# Patient Record
Sex: FEMALE | Race: OTHER | HISPANIC OR LATINO
[De-identification: names, ages, dates, MRNs, and addresses within clinical notes are randomized per-mention and may not be internally consistent; named-entity substitution may affect disease eponyms.]

---

## 2021-05-13 PROBLEM — Z00.00 ENCOUNTER FOR PREVENTIVE HEALTH EXAMINATION: Status: ACTIVE | Noted: 2021-05-13

## 2021-05-14 ENCOUNTER — APPOINTMENT (OUTPATIENT)
Dept: ANTEPARTUM | Facility: CLINIC | Age: 36
End: 2021-05-14

## 2021-06-04 ENCOUNTER — ASOB RESULT (OUTPATIENT)
Age: 36
End: 2021-06-04

## 2021-06-04 ENCOUNTER — APPOINTMENT (OUTPATIENT)
Dept: ANTEPARTUM | Facility: CLINIC | Age: 36
End: 2021-06-04
Payer: MEDICAID

## 2021-06-04 PROCEDURE — 76816 OB US FOLLOW-UP PER FETUS: CPT

## 2021-06-10 ENCOUNTER — ASOB RESULT (OUTPATIENT)
Age: 36
End: 2021-06-10

## 2021-06-10 ENCOUNTER — APPOINTMENT (OUTPATIENT)
Dept: MATERNAL FETAL MEDICINE | Facility: CLINIC | Age: 36
End: 2021-06-10
Payer: MEDICAID

## 2021-06-10 PROCEDURE — 99202 OFFICE O/P NEW SF 15 MIN: CPT | Mod: 95

## 2021-06-11 ENCOUNTER — APPOINTMENT (OUTPATIENT)
Dept: ANTEPARTUM | Facility: CLINIC | Age: 36
End: 2021-06-11
Payer: MEDICAID

## 2021-06-11 ENCOUNTER — TRANSCRIPTION ENCOUNTER (OUTPATIENT)
Age: 36
End: 2021-06-11

## 2021-06-11 DIAGNOSIS — O28.1 ABNORMAL BIOCHEMICAL FINDING ON ANTENATAL SCREENING OF MOTHER: ICD-10-CM

## 2021-06-11 DIAGNOSIS — O09.529 SUPERVISION OF ELDERLY MULTIGRAVIDA, UNSPECIFIED TRIMESTER: ICD-10-CM

## 2021-06-11 DIAGNOSIS — O35.1XX0 MATERNAL CARE FOR (SUSPECTED) CHROMOSOMAL ABNORMALITY IN FETUS, NOT APPLICABLE OR UNSPECIFIED: ICD-10-CM

## 2021-06-11 PROCEDURE — 36415 COLL VENOUS BLD VENIPUNCTURE: CPT

## 2021-06-18 ENCOUNTER — NON-APPOINTMENT (OUTPATIENT)
Age: 36
End: 2021-06-18

## 2021-07-02 ENCOUNTER — APPOINTMENT (OUTPATIENT)
Dept: ANTEPARTUM | Facility: CLINIC | Age: 36
End: 2021-07-02
Payer: MEDICAID

## 2021-07-02 ENCOUNTER — ASOB RESULT (OUTPATIENT)
Age: 36
End: 2021-07-02

## 2021-07-02 PROCEDURE — 76811 OB US DETAILED SNGL FETUS: CPT

## 2021-07-02 PROCEDURE — 99072 ADDL SUPL MATRL&STAF TM PHE: CPT

## 2021-07-02 PROCEDURE — 76817 TRANSVAGINAL US OBSTETRIC: CPT

## 2021-08-06 ENCOUNTER — APPOINTMENT (OUTPATIENT)
Age: 36
End: 2021-08-06
Payer: MEDICAID

## 2021-08-06 PROCEDURE — 0002A: CPT

## 2021-09-17 ENCOUNTER — ASOB RESULT (OUTPATIENT)
Age: 36
End: 2021-09-17

## 2021-09-17 ENCOUNTER — NON-APPOINTMENT (OUTPATIENT)
Age: 36
End: 2021-09-17

## 2021-09-17 ENCOUNTER — APPOINTMENT (OUTPATIENT)
Dept: ANTEPARTUM | Facility: CLINIC | Age: 36
End: 2021-09-17
Payer: MEDICAID

## 2021-09-17 PROCEDURE — 76819 FETAL BIOPHYS PROFIL W/O NST: CPT

## 2021-09-17 PROCEDURE — 76816 OB US FOLLOW-UP PER FETUS: CPT

## 2021-09-17 PROCEDURE — 76820 UMBILICAL ARTERY ECHO: CPT

## 2021-10-01 ENCOUNTER — ASOB RESULT (OUTPATIENT)
Age: 36
End: 2021-10-01

## 2021-10-01 ENCOUNTER — APPOINTMENT (OUTPATIENT)
Dept: ANTEPARTUM | Facility: CLINIC | Age: 36
End: 2021-10-01
Payer: MEDICAID

## 2021-10-01 PROCEDURE — 76819 FETAL BIOPHYS PROFIL W/O NST: CPT

## 2021-10-01 PROCEDURE — 76816 OB US FOLLOW-UP PER FETUS: CPT

## 2021-10-01 PROCEDURE — 76820 UMBILICAL ARTERY ECHO: CPT

## 2021-10-15 ENCOUNTER — APPOINTMENT (OUTPATIENT)
Dept: ANTEPARTUM | Facility: CLINIC | Age: 36
End: 2021-10-15
Payer: MEDICAID

## 2021-10-15 ENCOUNTER — ASOB RESULT (OUTPATIENT)
Age: 36
End: 2021-10-15

## 2021-10-15 PROCEDURE — 76816 OB US FOLLOW-UP PER FETUS: CPT

## 2021-10-15 PROCEDURE — 76820 UMBILICAL ARTERY ECHO: CPT

## 2021-10-15 PROCEDURE — 76819 FETAL BIOPHYS PROFIL W/O NST: CPT

## 2021-10-21 ENCOUNTER — ASOB RESULT (OUTPATIENT)
Age: 36
End: 2021-10-21

## 2021-10-21 ENCOUNTER — APPOINTMENT (OUTPATIENT)
Dept: ANTEPARTUM | Facility: CLINIC | Age: 36
End: 2021-10-21
Payer: MEDICAID

## 2021-10-21 PROCEDURE — 76820 UMBILICAL ARTERY ECHO: CPT

## 2021-10-21 PROCEDURE — 76818 FETAL BIOPHYS PROFILE W/NST: CPT

## 2021-10-28 ENCOUNTER — ASOB RESULT (OUTPATIENT)
Age: 36
End: 2021-10-28

## 2021-10-28 ENCOUNTER — APPOINTMENT (OUTPATIENT)
Dept: ANTEPARTUM | Facility: CLINIC | Age: 36
End: 2021-10-28
Payer: MEDICAID

## 2021-10-28 PROCEDURE — 76818 FETAL BIOPHYS PROFILE W/NST: CPT

## 2021-10-28 PROCEDURE — 76816 OB US FOLLOW-UP PER FETUS: CPT

## 2021-10-28 PROCEDURE — 76820 UMBILICAL ARTERY ECHO: CPT

## 2021-11-01 ENCOUNTER — APPOINTMENT (OUTPATIENT)
Dept: ANTEPARTUM | Facility: CLINIC | Age: 36
End: 2021-11-01
Payer: MEDICAID

## 2021-11-01 ENCOUNTER — ASOB RESULT (OUTPATIENT)
Age: 36
End: 2021-11-01

## 2021-11-01 PROCEDURE — 76820 UMBILICAL ARTERY ECHO: CPT

## 2021-11-01 PROCEDURE — 76818 FETAL BIOPHYS PROFILE W/NST: CPT

## 2021-11-04 ENCOUNTER — APPOINTMENT (OUTPATIENT)
Dept: ANTEPARTUM | Facility: CLINIC | Age: 36
End: 2021-11-04

## 2021-11-05 ENCOUNTER — INPATIENT (INPATIENT)
Facility: HOSPITAL | Age: 36
LOS: 2 days | Discharge: HOME | DRG: 560 | End: 2021-11-08
Attending: OBSTETRICS & GYNECOLOGY | Admitting: OBSTETRICS & GYNECOLOGY
Payer: MEDICAID

## 2021-11-05 ENCOUNTER — APPOINTMENT (OUTPATIENT)
Dept: ANTEPARTUM | Facility: CLINIC | Age: 36
End: 2021-11-05

## 2021-11-05 VITALS — HEIGHT: 64 IN | WEIGHT: 169.98 LBS

## 2021-11-05 DIAGNOSIS — Z3A.00 WEEKS OF GESTATION OF PREGNANCY NOT SPECIFIED: ICD-10-CM

## 2021-11-05 LAB
BASOPHILS # BLD AUTO: 0.03 K/UL — SIGNIFICANT CHANGE UP (ref 0–0.2)
BASOPHILS NFR BLD AUTO: 0.2 % — SIGNIFICANT CHANGE UP (ref 0–2)
BLD GP AB SCN SERPL QL: SIGNIFICANT CHANGE UP
COVID-19 SPIKE DOMAIN AB INTERP: POSITIVE
COVID-19 SPIKE DOMAIN ANTIBODY RESULT: >250 U/ML — HIGH
EOSINOPHIL # BLD AUTO: 0.04 K/UL — SIGNIFICANT CHANGE UP (ref 0–0.5)
EOSINOPHIL NFR BLD AUTO: 0.3 % — SIGNIFICANT CHANGE UP (ref 0–6)
HCT VFR BLD CALC: 35.7 % — SIGNIFICANT CHANGE UP (ref 34.5–45)
HGB BLD-MCNC: 12.2 G/DL — SIGNIFICANT CHANGE UP (ref 11.5–15.5)
IMM GRANULOCYTES NFR BLD AUTO: 0.7 % — SIGNIFICANT CHANGE UP (ref 0–1.5)
LYMPHOCYTES # BLD AUTO: 1.96 K/UL — SIGNIFICANT CHANGE UP (ref 1–3.3)
LYMPHOCYTES # BLD AUTO: 16.3 % — SIGNIFICANT CHANGE UP (ref 13–44)
MCHC RBC-ENTMCNC: 31 PG — SIGNIFICANT CHANGE UP (ref 27–34)
MCHC RBC-ENTMCNC: 34.2 GM/DL — SIGNIFICANT CHANGE UP (ref 32–36)
MCV RBC AUTO: 90.8 FL — SIGNIFICANT CHANGE UP (ref 80–100)
MONOCYTES # BLD AUTO: 0.97 K/UL — HIGH (ref 0–0.9)
MONOCYTES NFR BLD AUTO: 8.1 % — SIGNIFICANT CHANGE UP (ref 2–14)
NEUTROPHILS # BLD AUTO: 8.94 K/UL — HIGH (ref 1.8–7.4)
NEUTROPHILS NFR BLD AUTO: 74.4 % — SIGNIFICANT CHANGE UP (ref 43–77)
PLATELET # BLD AUTO: 215 K/UL — SIGNIFICANT CHANGE UP (ref 150–400)
RBC # BLD: 3.93 M/UL — SIGNIFICANT CHANGE UP (ref 3.8–5.2)
RBC # FLD: 13.1 % — SIGNIFICANT CHANGE UP (ref 10.3–14.5)
SARS-COV-2 IGG+IGM SERPL QL IA: >250 U/ML — HIGH
SARS-COV-2 IGG+IGM SERPL QL IA: POSITIVE
SARS-COV-2 RNA SPEC QL NAA+PROBE: SIGNIFICANT CHANGE UP
T PALLIDUM AB TITR SER: NEGATIVE — SIGNIFICANT CHANGE UP
WBC # BLD: 12.03 K/UL — HIGH (ref 3.8–10.5)
WBC # FLD AUTO: 12.03 K/UL — HIGH (ref 3.8–10.5)

## 2021-11-05 PROCEDURE — 85025 COMPLETE CBC W/AUTO DIFF WBC: CPT

## 2021-11-05 PROCEDURE — 85018 HEMOGLOBIN: CPT

## 2021-11-05 PROCEDURE — 86900 BLOOD TYPING SEROLOGIC ABO: CPT

## 2021-11-05 PROCEDURE — 94760 N-INVAS EAR/PLS OXIMETRY 1: CPT

## 2021-11-05 PROCEDURE — U0005: CPT

## 2021-11-05 PROCEDURE — 86850 RBC ANTIBODY SCREEN: CPT

## 2021-11-05 PROCEDURE — 85014 HEMATOCRIT: CPT

## 2021-11-05 PROCEDURE — U0003: CPT

## 2021-11-05 PROCEDURE — 86780 TREPONEMA PALLIDUM: CPT

## 2021-11-05 PROCEDURE — C1889: CPT

## 2021-11-05 PROCEDURE — 86769 SARS-COV-2 COVID-19 ANTIBODY: CPT

## 2021-11-05 PROCEDURE — 36415 COLL VENOUS BLD VENIPUNCTURE: CPT

## 2021-11-05 PROCEDURE — 86901 BLOOD TYPING SEROLOGIC RH(D): CPT

## 2021-11-05 PROCEDURE — 59050 FETAL MONITOR W/REPORT: CPT

## 2021-11-05 RX ORDER — CITRIC ACID/SODIUM CITRATE 300-500 MG
30 SOLUTION, ORAL ORAL ONCE
Refills: 0 | Status: DISCONTINUED | OUTPATIENT
Start: 2021-11-05 | End: 2021-11-08

## 2021-11-05 RX ORDER — OXYTOCIN 10 UNIT/ML
333.33 VIAL (ML) INJECTION
Qty: 20 | Refills: 0 | Status: DISCONTINUED | OUTPATIENT
Start: 2021-11-05 | End: 2021-11-08

## 2021-11-05 RX ORDER — SODIUM CHLORIDE 9 MG/ML
1000 INJECTION, SOLUTION INTRAVENOUS
Refills: 0 | Status: DISCONTINUED | OUTPATIENT
Start: 2021-11-05 | End: 2021-11-06

## 2021-11-05 RX ORDER — CITRIC ACID/SODIUM CITRATE 300-500 MG
15 SOLUTION, ORAL ORAL EVERY 6 HOURS
Refills: 0 | Status: DISCONTINUED | OUTPATIENT
Start: 2021-11-05 | End: 2021-11-05

## 2021-11-05 RX ORDER — SODIUM CHLORIDE 9 MG/ML
1000 INJECTION, SOLUTION INTRAVENOUS
Refills: 0 | Status: DISCONTINUED | OUTPATIENT
Start: 2021-11-05 | End: 2021-11-05

## 2021-11-05 NOTE — PATIENT PROFILE OB - CAREGIVER NAME
[facility] BREAST [facility]  NEW PATIENT HISTORY AND PHYSICAL EXAMINATION    DATE OF VISIT:  2018    PRIMARY CARE PROVIDER: Jhon Du MD      CHIEF COMPLAINT:   I had the pleasure of meeting with this néstor 27-year-old woman at the kind request of Dr. Du for evaluation of a left axillary mass.    HISTORY OF PRESENT ILLNESS:    The patient states that about 3 years ago she noticed a pea-sized lump in the low anterior left axilla. She states some days it feels better than others. It has gradually increased in size and now feels about the size of a marble. It is smooth, firm, mobile. She feels it is getting harder over time. There are no overlying skin changes. She occasionally notes itching in the area. She occasionally experiences random sharp pains \"out of the blue\" not associated with any movement or activity. They only last a second or two. They do not occur every day.    She cannot recall any infection or event that was associated with onset of this lump. She shaves her axillary areas but does not recall ever having a problem with a superficial skin infection.     She reports that she did have shingles on her chest 4 years ago, but cannot recall which side or where on her chest the lesions appeared. She was treated with medication for this.    RECENT STUDIES:  MAMMOGRAM: None.    ULTRASOUND SOFT TISSUE AXILLA LEFT (2018): HISTORY: Mass of left axilla [R22.32 (ICD-10-CM)] COMPARISON: None. FINDINGS/ IMPRESSION: A solid 1.74 x 1.73 x 1.10 mass is present within the axilla corresponding to the palpable lump. Blood flow is to this mass. This mass should be considered a pathologic lymph node until proven otherwise.    GENERAL BREAST HISTORY:  Age at menarche: 10 years old  Last menstrual period: 2018  :  0     Para:  0     Miscarriages:  0     Abortions:  0  Age at first completed pregnancy:  Nulliparous  Breast Feeding History:  Not applicable       History of oral contraceptives:  Yes ×2 years, not currently using.           Other birth control medications (ie Depo injections):  None.  History of hormone exposure (Hormone Replacement Therapy or Fertility assistance):  None.     PREVIOUS BREAST BIOPSIES / SURGERIES:  None.    FAMILY HISTORY:  Race / Ethnicity:  Half /half Filipina  Mother:  Alive in her 50s with no known medical problems  Father:  Alive in his 50s with no known medical problems  Number of brothers:  1 / sisters:  0      Number of maternal aunts:  1 / uncles:  2. Number of paternal aunts:  1 / uncles:  2    FAMILIAL CANCER HISTORY:    Uncle with lung cancer  in his late 50s; he was a smoker.    PAST MEDICAL HISTORY:    Asthma  Vitamin D deficiency (13.1 on 12)    PAST SURGICAL HISTORY:  None.    MEDICATIONS:  Reviewed in Epic.  Note this list represents patient report of current medications.  Current Outpatient Prescriptions   Medication Sig   • budesonide/formoterol (SYMBICORT) 80-4.5 MCG/ACT inhaler Inhale 2 puffs into the lungs 2 times daily.   • cyclobenzaprine (FLEXERIL) 10 MG tablet 1 TAB P.O QHS .DO NOT DRIVE   • Green Tea, Camillia sinensis, (GREEN TEA EXTRACT PO)    • Cyanocobalamin (VITAMIN B12 PO)    • triamcinolone (KENALOG) 0.5 % cream Bid on affected areas   • albuterol (PROAIR HFA) 108 (90 BASE) MCG/ACT inhaler Inhale 2 puffs into the lungs every 4 hours as needed for Shortness of Breath or Wheezing.   • loratadine (CLARITIN) 10 MG tablet Take 10 mg by mouth daily.   • ibuprofen (MOTRIN) 600 MG tablet Take 1 tablet by mouth every 8 hours as needed for Pain (AS NEEDED FOR BACK PAIN).   • B Complex Vitamins (VITAMIN B COMPLEX PO) Take  by mouth.   • Cholecalciferol (VITAMIN D-3) 5000 UNITS TABS Take  by mouth.     No current facility-administered medications for this visit.        ALLERGIES/SENSITIVITIES:  No known drug allergies    SOCIAL HISTORY:  Marital status:  . She is accompanied to today's visit by  her  Marvel.  Employment:      HEALTH RELATED BEHAVIORS:   Caffeine:  2 caffeinated beverages daily  Tobacco:  Current smoker of cigarettes ×13 years.  Alcohol:  Yes, weekly  Recreational drug use:  None  Exercise:  No regular exercise    REVIEW OF SYSTEMS:  CONSTITUTIONAL:  No complaint of hot flashes, night sweats, fever or fatigue. No change in appetite or weight.  HEENT: No change in vision or hearing. No difficulty swallowing.  RESPIRATORY: No cough or shortness of breath.  CARDIOVASCULAR: No chest pain or palpitations.  GASTROINTESTINAL: No nausea, vomiting or abdominal pain. No constipation or diarrhea. No bloody stool.  GENITOURINARY: No complaint of incontinence, hematuria or dysuria.  MUSCULOSKELETAL: No muscle, bone or joint pain. No weakness.  NEUROLOGICAL:  No headache, coordination change, numbness or tingling.   SKIN: No rash, itching, skin changes, or hair loss.  PSYCHIATRIC: No anxiety, depression or distress.  HEMATOLOGIC/LYMPHATIC: No swelling of the arms or legs. No lumps or swollen glands. No bruising or bleeding.     PHYSICAL EXAMINATION:  CONSTITUTIONAL: The patient is a well-developed  woman in no apparent physical distress.  VITAL SIGNS: Height: 5 feet 2 inches   Weight: 137 pounds  BP: 126/82  P: 80  R: 18  T: 98.2  ENT: Conjunctiva non-injected, anicteric.  Hearing within normal limits to spoken voice. Oral mucosa moist.  NECK: Trachea midline; no masses.  No thyroid mass, enlargement or tenderness.   LYMPH: No cervical or supraclavicular adenopathy. No lymphedema.  RESPIRATORY: Lungs clear to auscultation. Normal respiratory excursion. No wheezing or crackles.  CARDIOVASCULAR: Regular rate and rhythm. No murmur or abnormal sounds. No peripheral edema.   ABDOMEN: Soft, non-tender. No palpable masses. No hepatosplenomegaly. No noted hernia.   MUSCULOSKELETAL: Steady gait and balance. No clubbing, cyanosis, or asymmetry. Normal muscle strength and tone. Full range of  motion of bilateral upper extremities. No spinal tenderness.   SKIN: No rash, lesions or nodules.   NEUROLOGICAL: EOM normal. Normal sensation to touch.   PSYCHIATRIC: Alert and oriented to person, place and time. Judgment and insight normal.  Recent and remote memory intact.     BREASTS: Examined in the upright and supine positions.  CONTOUR:  Symmetric    RIGHT BREAST/AXILLA: The tissue of the breast is soft, lobular.  There are no palpable masses.  There is no erythema, induration, thickening, retraction or peau d’orange changes to the skin.  The nipple is everted without discharge or skin changes. There is no axillary adenopathy.     LEFT BREAST/AXILLA: The tissue of the breast is soft, lobular.  There are no palpable masses.  There is no erythema, induration, thickening, retraction or peau d’orange changes to the skin.  The nipple is everted without discharge or skin changes. There is an approximately 2 cm smooth, soft, compressible, highly mobile lymph nodes palpable in the low anterior axillary area.    ULTRASOUND: None.    PROCEDURES: None.    COUNSELING/COORDINATION OF CARE:  Normal breast anatomy and the anatomy of the lymphatic system were reviewed and illustrated for the patient and her . She was questioned regarding possible infections that could have accounted for the enlarged lymph node, but denies any recall of infections, other than shingles 4 years ago. She is unable to recall which side of her chest the lesions occurred on. She did not notice the enlarged lymph node until a year later.    It is reassuring that the palpable lymph node has been present for 3 years without significant change or any additional lymphadenopathy. While it probably is benign, as the patient feels that it is changing somewhat in characteristics by increasing slightly in size and firmness, core biopsy for definitive diagnosis is recommended. She was questioned about her wishes regarding excision of the area. She  feels that if core biopsy is benign she would not pursue excision, as it does not bother her.    She will be scheduled for ultrasound-guided core biopsy of the left axillary mass. Further follow-up will depend on results of pathology.    ASSESSMENT:  1. Enlarged left axillary lymph node x 3 years; 1.74 x 1.73 x 1.10 cm on 2/2/18 ultrasound.  2. The patient can recall no infection or cause for development of the enlarged lymph node.    PLAN:  1. Core biopsy of the enlarged left axillary lymph node under ultrasound guidance for definitive diagnosis; include flow cytometry.  2. Further recommendations for follow-up will depend on results of biopsy.  3. The patient and her  are aware they may contact us at any time if there are further questions or concerns.     Ruddy Rj Torres

## 2021-11-05 NOTE — PATIENT PROFILE OB - SPIRITUAL CULTURAL, CURRENT SITUATION, PROFILE
----- Message from Agnes Read MD sent at 9/12/2017  9:53 PM CDT -----  Please call patient to make sure she knows U/S looks good. Make sure she has first OB visit   none

## 2021-11-05 NOTE — PATIENT PROFILE OB - DELIVERY INFORMATION-PLACENTA STATUS, BABY A
Brandee Hebert is here today for Medicare Wellness Visit (SUB ) and Office Visit    Concerns/symptoms: none   Medications: medications verified and updated  Refills needed today? No     Tobacco history: verified  Advanced Directives: No not on file, not interested.  BP greater than 140/90? No    Patient would like communication of their results via:  [employer]    Cell Phone:   Telephone Information:   Mobile [phone]     Okay to leave a message containing results? Yes  Preferred language:  Samoan.    Health Maintenance Due   Topic Date Due   • Shingles Vaccine (1 of 2) Never done   • Diabetes Foot Exam  01/23/2021   • Diabetes Eye Exam  06/16/2021   • Medicare Wellness Visit  07/03/2021   • Influenza Vaccine (1) 09/01/2021      Patient is due for the topics as listed above and wishes to proceed with them. Orders placed for Immunization(s) Influenza..    Medicare HRA:  1.) Do you have an Advance directive, living will, or power of  for health care document that contains your wishes for end of life care?: No     4.) During the past 4 weeks, what was the hardest physical activity you could do for at least 2 minutes?: Light     6 a.) How many servings of Fruits and Vegetables do you have each day ( 1 serving = 1 piece of fruit, 1/2 cup fruits or vegetables): 1 per day     6 b.) How many servings of High Fiber / Whole Grain Foods to you have each day ( 1 serving = 1 cup cold cereal, 1/2 cup cooked cereal, 1 slice bread): 1 per day           PHQ 2:  Date Adult PHQ 2 Score Adult PHQ 2 Interpretation   10/19/2021 0 No further screening needed       PHQ 9:        intact/delivered spontaneously

## 2021-11-05 NOTE — PATIENT PROFILE OB - ALERT: PERTINENT HISTORY
Fetal Sonogram/1st Trimester Sonogram/20 Week Level II Sonogram/BioPhysical Profile(s)/Follow up Sonogram for Growth

## 2021-11-06 PROCEDURE — 59409 OBSTETRICAL CARE: CPT | Mod: U9

## 2021-11-06 RX ORDER — LANOLIN
1 OINTMENT (GRAM) TOPICAL EVERY 6 HOURS
Refills: 0 | Status: DISCONTINUED | OUTPATIENT
Start: 2021-11-06 | End: 2021-11-08

## 2021-11-06 RX ORDER — MAGNESIUM HYDROXIDE 400 MG/1
30 TABLET, CHEWABLE ORAL
Refills: 0 | Status: DISCONTINUED | OUTPATIENT
Start: 2021-11-06 | End: 2021-11-08

## 2021-11-06 RX ORDER — KETOROLAC TROMETHAMINE 30 MG/ML
30 SYRINGE (ML) INJECTION ONCE
Refills: 0 | Status: DISCONTINUED | OUTPATIENT
Start: 2021-11-06 | End: 2021-11-08

## 2021-11-06 RX ORDER — SODIUM CHLORIDE 9 MG/ML
3 INJECTION INTRAMUSCULAR; INTRAVENOUS; SUBCUTANEOUS EVERY 8 HOURS
Refills: 0 | Status: DISCONTINUED | OUTPATIENT
Start: 2021-11-06 | End: 2021-11-08

## 2021-11-06 RX ORDER — ONDANSETRON 8 MG/1
4 TABLET, FILM COATED ORAL ONCE
Refills: 0 | Status: COMPLETED | OUTPATIENT
Start: 2021-11-06 | End: 2021-11-06

## 2021-11-06 RX ORDER — BENZOCAINE 10 %
1 GEL (GRAM) MUCOUS MEMBRANE EVERY 6 HOURS
Refills: 0 | Status: DISCONTINUED | OUTPATIENT
Start: 2021-11-06 | End: 2021-11-08

## 2021-11-06 RX ORDER — DIPHENHYDRAMINE HCL 50 MG
25 CAPSULE ORAL EVERY 6 HOURS
Refills: 0 | Status: DISCONTINUED | OUTPATIENT
Start: 2021-11-06 | End: 2021-11-08

## 2021-11-06 RX ORDER — TETANUS TOXOID, REDUCED DIPHTHERIA TOXOID AND ACELLULAR PERTUSSIS VACCINE, ADSORBED 5; 2.5; 8; 8; 2.5 [IU]/.5ML; [IU]/.5ML; UG/.5ML; UG/.5ML; UG/.5ML
0.5 SUSPENSION INTRAMUSCULAR ONCE
Refills: 0 | Status: DISCONTINUED | OUTPATIENT
Start: 2021-11-06 | End: 2021-11-08

## 2021-11-06 RX ORDER — IBUPROFEN 200 MG
600 TABLET ORAL EVERY 6 HOURS
Refills: 0 | Status: COMPLETED | OUTPATIENT
Start: 2021-11-06 | End: 2022-10-05

## 2021-11-06 RX ORDER — SIMETHICONE 80 MG/1
80 TABLET, CHEWABLE ORAL EVERY 4 HOURS
Refills: 0 | Status: DISCONTINUED | OUTPATIENT
Start: 2021-11-06 | End: 2021-11-08

## 2021-11-06 RX ORDER — ACETAMINOPHEN 500 MG
975 TABLET ORAL
Refills: 0 | Status: DISCONTINUED | OUTPATIENT
Start: 2021-11-06 | End: 2021-11-08

## 2021-11-06 RX ORDER — IBUPROFEN 200 MG
600 TABLET ORAL EVERY 6 HOURS
Refills: 0 | Status: DISCONTINUED | OUTPATIENT
Start: 2021-11-06 | End: 2021-11-08

## 2021-11-06 RX ORDER — PRAMOXINE HYDROCHLORIDE 150 MG/15G
1 AEROSOL, FOAM RECTAL EVERY 4 HOURS
Refills: 0 | Status: DISCONTINUED | OUTPATIENT
Start: 2021-11-06 | End: 2021-11-08

## 2021-11-06 RX ORDER — HYDROCORTISONE 1 %
1 OINTMENT (GRAM) TOPICAL EVERY 6 HOURS
Refills: 0 | Status: DISCONTINUED | OUTPATIENT
Start: 2021-11-06 | End: 2021-11-08

## 2021-11-06 RX ORDER — OXYTOCIN 10 UNIT/ML
333.33 VIAL (ML) INJECTION
Qty: 20 | Refills: 0 | Status: DISCONTINUED | OUTPATIENT
Start: 2021-11-06 | End: 2021-11-08

## 2021-11-06 RX ORDER — OXYCODONE HYDROCHLORIDE 5 MG/1
5 TABLET ORAL
Refills: 0 | Status: DISCONTINUED | OUTPATIENT
Start: 2021-11-06 | End: 2021-11-08

## 2021-11-06 RX ORDER — DIBUCAINE 1 %
1 OINTMENT (GRAM) RECTAL EVERY 6 HOURS
Refills: 0 | Status: DISCONTINUED | OUTPATIENT
Start: 2021-11-06 | End: 2021-11-08

## 2021-11-06 RX ORDER — KETOROLAC TROMETHAMINE 30 MG/ML
30 SYRINGE (ML) INJECTION ONCE
Refills: 0 | Status: DISCONTINUED | OUTPATIENT
Start: 2021-11-06 | End: 2021-11-06

## 2021-11-06 RX ORDER — AER TRAVELER 0.5 G/1
1 SOLUTION RECTAL; TOPICAL EVERY 4 HOURS
Refills: 0 | Status: DISCONTINUED | OUTPATIENT
Start: 2021-11-06 | End: 2021-11-08

## 2021-11-06 RX ORDER — OXYCODONE HYDROCHLORIDE 5 MG/1
5 TABLET ORAL ONCE
Refills: 0 | Status: DISCONTINUED | OUTPATIENT
Start: 2021-11-06 | End: 2021-11-08

## 2021-11-06 RX ADMIN — ONDANSETRON 4 MILLIGRAM(S): 8 TABLET, FILM COATED ORAL at 04:40

## 2021-11-06 RX ADMIN — Medication 1000 MILLIUNIT(S)/MIN: at 05:06

## 2021-11-06 RX ADMIN — Medication 600 MILLIGRAM(S): at 12:55

## 2021-11-06 RX ADMIN — Medication 975 MILLIGRAM(S): at 15:39

## 2021-11-06 RX ADMIN — Medication 975 MILLIGRAM(S): at 10:30

## 2021-11-06 RX ADMIN — Medication 975 MILLIGRAM(S): at 16:20

## 2021-11-06 RX ADMIN — Medication 975 MILLIGRAM(S): at 21:41

## 2021-11-06 RX ADMIN — Medication 600 MILLIGRAM(S): at 18:38

## 2021-11-06 RX ADMIN — Medication 600 MILLIGRAM(S): at 17:50

## 2021-11-06 RX ADMIN — Medication 30 MILLIGRAM(S): at 04:40

## 2021-11-06 RX ADMIN — Medication 1 TABLET(S): at 09:49

## 2021-11-06 RX ADMIN — SODIUM CHLORIDE 3 MILLILITER(S): 9 INJECTION INTRAMUSCULAR; INTRAVENOUS; SUBCUTANEOUS at 06:32

## 2021-11-06 RX ADMIN — Medication 975 MILLIGRAM(S): at 22:40

## 2021-11-06 RX ADMIN — Medication 975 MILLIGRAM(S): at 09:49

## 2021-11-06 RX ADMIN — Medication 30 MILLIGRAM(S): at 07:31

## 2021-11-06 RX ADMIN — Medication 600 MILLIGRAM(S): at 13:20

## 2021-11-07 LAB
HCT VFR BLD CALC: 32.5 % — LOW (ref 34.5–45)
HGB BLD-MCNC: 10.5 G/DL — LOW (ref 11.5–15.5)

## 2021-11-07 RX ADMIN — Medication 600 MILLIGRAM(S): at 17:50

## 2021-11-07 RX ADMIN — Medication 600 MILLIGRAM(S): at 11:59

## 2021-11-07 RX ADMIN — Medication 975 MILLIGRAM(S): at 21:06

## 2021-11-07 RX ADMIN — Medication 975 MILLIGRAM(S): at 04:25

## 2021-11-07 RX ADMIN — Medication 975 MILLIGRAM(S): at 16:45

## 2021-11-07 RX ADMIN — Medication 975 MILLIGRAM(S): at 09:10

## 2021-11-07 RX ADMIN — Medication 975 MILLIGRAM(S): at 05:20

## 2021-11-07 RX ADMIN — Medication 600 MILLIGRAM(S): at 06:31

## 2021-11-07 RX ADMIN — Medication 600 MILLIGRAM(S): at 18:30

## 2021-11-07 RX ADMIN — Medication 975 MILLIGRAM(S): at 08:23

## 2021-11-07 RX ADMIN — Medication 975 MILLIGRAM(S): at 15:59

## 2021-11-07 RX ADMIN — Medication 1 TABLET(S): at 08:23

## 2021-11-07 RX ADMIN — Medication 975 MILLIGRAM(S): at 22:00

## 2021-11-07 RX ADMIN — Medication 600 MILLIGRAM(S): at 07:39

## 2021-11-07 RX ADMIN — Medication 600 MILLIGRAM(S): at 12:45

## 2021-11-08 ENCOUNTER — TRANSCRIPTION ENCOUNTER (OUTPATIENT)
Age: 36
End: 2021-11-08

## 2021-11-08 ENCOUNTER — APPOINTMENT (OUTPATIENT)
Dept: ANTEPARTUM | Facility: CLINIC | Age: 36
End: 2021-11-08

## 2021-11-08 VITALS
OXYGEN SATURATION: 96 % | SYSTOLIC BLOOD PRESSURE: 117 MMHG | RESPIRATION RATE: 17 BRPM | TEMPERATURE: 98 F | HEART RATE: 95 BPM | DIASTOLIC BLOOD PRESSURE: 70 MMHG

## 2021-11-08 RX ORDER — IBUPROFEN 200 MG
1 TABLET ORAL
Qty: 0 | Refills: 0 | DISCHARGE
Start: 2021-11-08

## 2021-11-08 RX ORDER — ACETAMINOPHEN 500 MG
3 TABLET ORAL
Qty: 0 | Refills: 0 | DISCHARGE
Start: 2021-11-08

## 2021-11-08 RX ADMIN — Medication 600 MILLIGRAM(S): at 01:30

## 2021-11-08 RX ADMIN — Medication 975 MILLIGRAM(S): at 04:00

## 2021-11-08 RX ADMIN — Medication 975 MILLIGRAM(S): at 03:11

## 2021-11-08 RX ADMIN — Medication 600 MILLIGRAM(S): at 06:41

## 2021-11-08 RX ADMIN — Medication 600 MILLIGRAM(S): at 00:30

## 2021-11-08 NOTE — DISCHARGE NOTE OB - CARE PLAN
1 Principal Discharge DX:	 (normal spontaneous vaginal delivery)  Assessment and plan of treatment:	Please make an appointment to follow up with your OBGYN in 6 weeks

## 2021-11-08 NOTE — DISCHARGE NOTE OB - CARE PROVIDER_API CALL
Kaycee Sotelo)  Obstetrics and Gynecology  [address] [place] [address]  Phone: [phone]  Fax: [phone]  Follow Up Time:

## 2021-11-08 NOTE — DISCHARGE NOTE OB - MEDICATION SUMMARY - MEDICATIONS TO TAKE
I will START or STAY ON the medications listed below when I get home from the hospital:    acetaminophen 325 mg oral tablet  -- 3 tab(s) by mouth   -- Indication: For Weeks of gestation of pregnancy not specified    ibuprofen 600 mg oral tablet  -- 1 tab(s) by mouth every 6 hours  -- Indication: For Weeks of gestation of pregnancy not specified    Prena1 oral capsule  -- 1 cap(s) by mouth once a day  -- Indication: For Weeks of gestation of pregnancy not specified

## 2021-11-08 NOTE — PROGRESS NOTE ADULT - SUBJECTIVE AND OBJECTIVE BOX
S: Patient doing well. Minimal lochia. Pain controlled.    O: Vital Signs Last 24 Hrs  T(C): 36.6 (2021 09:21), Max: 36.8 (2021 20:05)  T(F): 97.9 (2021 09:21), Max: 98.3 (2021 20:05)  HR: 95 (2021 09:21) (80 - 95)  BP: 117/70 (2021 09:21) (117/70 - 118/66)  BP(mean): --  RR: 17 (2021 09:21) (17 - 17)  SpO2: 96% (2021 09:21) (96% - 98%)    Gen: NAD  Abd: soft, NT, ND, fundus firm below umbilicus  Lochia: moderate  Ext: no tenderness    Labs:                        10.5   x     )-----------( x        ( 2021 06:52 )             32.5       A: 35y PPD#2 s/p  doing well.    Plan: Stable for discharge home. 
36 yo  s/p   PPD#1   Denies fever/chills, nausea/emesis, dyspnea, or dizziness.  Patient not comfortable for d/c home today.  Encouraged to ambulate.  +breastfeeding    Gen:     In NAD    Abdomen         soft   non tender  Ext                   no calf tenderness

## 2021-11-08 NOTE — DISCHARGE NOTE OB - PATIENT PORTAL LINK FT
You can access the FollowMyHealth Patient Portal offered by [facility] by registering at the following website: [email or web address]. By joining [employer]’s FollowMyHealth portal, you will also be able to view your health information using other applications (apps) compatible with our system.
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
## 2021-11-11 ENCOUNTER — APPOINTMENT (OUTPATIENT)
Dept: ANTEPARTUM | Facility: CLINIC | Age: 36
End: 2021-11-11

## 2021-11-11 DIAGNOSIS — Z3A.38 38 WEEKS GESTATION OF PREGNANCY: ICD-10-CM

## 2021-11-11 DIAGNOSIS — O36.5930 MATERNAL CARE FOR OTHER KNOWN OR SUSPECTED POOR FETAL GROWTH, THIRD TRIMESTER, NOT APPLICABLE OR UNSPECIFIED: ICD-10-CM

## 2021-11-12 ENCOUNTER — APPOINTMENT (OUTPATIENT)
Dept: ANTEPARTUM | Facility: CLINIC | Age: 36
End: 2021-11-12

## 2021-11-15 ENCOUNTER — APPOINTMENT (OUTPATIENT)
Dept: ANTEPARTUM | Facility: CLINIC | Age: 36
End: 2021-11-15

## 2022-11-23 NOTE — DISCHARGE NOTE OB - PRO FEEDING PLAN INFANT OB
Oxybutynin Counseling:  I discussed with the patient the risks of oxybutynin including but not limited to skin rash, drowsiness, dry mouth, difficulty urinating, and blurred vision. initiation of breastfeeding/breast milk feeding

## 2023-01-14 ENCOUNTER — EMERGENCY (EMERGENCY)
Facility: HOSPITAL | Age: 38
LOS: 0 days | Discharge: HOME | End: 2023-01-14
Attending: STUDENT IN AN ORGANIZED HEALTH CARE EDUCATION/TRAINING PROGRAM
Payer: COMMERCIAL

## 2023-01-14 VITALS
OXYGEN SATURATION: 98 % | DIASTOLIC BLOOD PRESSURE: 87 MMHG | SYSTOLIC BLOOD PRESSURE: 147 MMHG | HEART RATE: 89 BPM | RESPIRATION RATE: 17 BRPM | TEMPERATURE: 98 F

## 2023-01-14 VITALS — WEIGHT: 160.06 LBS

## 2023-01-14 DIAGNOSIS — M54.2 CERVICALGIA: ICD-10-CM
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
## 2023-01-14 DIAGNOSIS — M54.50 LOW BACK PAIN, UNSPECIFIED: ICD-10-CM

## 2023-01-14 DIAGNOSIS — S16.1XXA STRAIN OF MUSCLE, FASCIA AND TENDON AT NECK LEVEL, INITIAL ENCOUNTER: ICD-10-CM

## 2023-01-14 PROCEDURE — 99282 EMERGENCY DEPT VISIT SF MDM: CPT

## 2023-01-14 PROCEDURE — 99284 EMERGENCY DEPT VISIT MOD MDM: CPT

## 2023-01-14 RX ORDER — IBUPROFEN 200 MG
600 TABLET ORAL ONCE
Refills: 0 | Status: COMPLETED | OUTPATIENT
Start: 2023-01-14 | End: 2023-01-14

## 2023-01-14 RX ADMIN — Medication 600 MILLIGRAM(S): at 16:57
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
## 2023-01-14 NOTE — ED STATDOCS - ATTENDING APP SHARED VISIT CONTRIBUTION OF CARE
I, Mojgan Mills DO,  performed the initial face to face bedside interview with this patient regarding history of present illness, review of symptoms and relevant past medical, social and family history.  I completed an independent physical examination.  I was the initial provider who evaluated this patient.   I personally saw the patient and performed a substantive portion of the visit including all aspects of the medical decision making.  I have signed out the follow up of any pending tests (i.e. labs, radiological studies) to the ACP.  I have communicated the patient’s plan of care and disposition with the ACP.  The history, relevant review of systems, past medical and surgical history, medical decision making, and physical examination was documented by the scribe in my presence and I attest to the accuracy of the documentation.
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
## 2023-01-14 NOTE — ED STATDOCS - PATIENT PORTAL LINK FT
You can access the FollowMyHealth Patient Portal offered by [facility] by registering at the following website: [email or web address]. By joining [employer]’s FollowMyHealth portal, you will also be able to view your health information using other applications (apps) compatible with our system.
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
## 2023-08-28 ENCOUNTER — OFFICE (OUTPATIENT)
Dept: URBAN - METROPOLITAN AREA CLINIC 102 | Facility: CLINIC | Age: 38
Setting detail: OPHTHALMOLOGY
End: 2023-08-28
Payer: COMMERCIAL

## 2023-08-28 DIAGNOSIS — H43.393: ICD-10-CM

## 2023-08-28 DIAGNOSIS — D31.30: ICD-10-CM

## 2023-08-28 PROCEDURE — 92014 COMPRE OPH EXAM EST PT 1/>: CPT | Performed by: OPHTHALMOLOGY

## 2023-08-28 ASSESSMENT — VISUAL ACUITY
OD_BCVA: 20/40+1
OS_BCVA: 20/30-2

## 2023-08-28 ASSESSMENT — CONFRONTATIONAL VISUAL FIELD TEST (CVF)
OS_FINDINGS: FULL
OD_FINDINGS: FULL

## 2023-08-28 ASSESSMENT — REFRACTION_AUTOREFRACTION
OD_SPHERE: +0.50
OS_AXIS: 070
OD_CYLINDER: -0.75
OD_AXIS: 106
OS_SPHERE: +0.75
OS_CYLINDER: -1.00

## 2023-08-28 ASSESSMENT — AXIALLENGTH_DERIVED
OS_AL: 23.5919
OS_AL: 23.4949
OD_AL: 23.7305
OD_AL: 23.6813

## 2023-08-28 ASSESSMENT — KERATOMETRY
OD_K2POWER_DIOPTERS: 43.50
OS_K2POWER_DIOPTERS: 44.00
OS_K1POWER_DIOPTERS: 43.00
OD_K1POWER_DIOPTERS: 42.75
OS_AXISANGLE_DEGREES: 137
METHOD_AUTO_MANUAL: AUTO
OD_AXISANGLE_DEGREES: 061

## 2023-08-28 ASSESSMENT — REFRACTION_MANIFEST
OU_VA: 20/20
OS_VA1: 20/20
OS_AXIS: 075
OD_SPHERE: +0.25
OD_VA1: 20/20
OS_CYLINDER: -1.00
OD_AXIS: 105
OS_SPHERE: +0.50
OD_CYLINDER: -0.50

## 2023-08-28 ASSESSMENT — TONOMETRY
OD_IOP_MMHG: 17
OS_IOP_MMHG: 15

## 2023-08-28 ASSESSMENT — SPHEQUIV_DERIVED
OD_SPHEQUIV: 0.125
OS_SPHEQUIV: 0.25
OS_SPHEQUIV: 0
OD_SPHEQUIV: 0

## 2023-11-06 ENCOUNTER — RESULT REVIEW (OUTPATIENT)
Age: 38
End: 2023-11-06

## 2023-11-06 PROBLEM — Z78.9 OTHER SPECIFIED HEALTH STATUS: Chronic | Status: ACTIVE | Noted: 2023-01-15

## 2023-12-04 ENCOUNTER — RESULT REVIEW (OUTPATIENT)
Age: 38
End: 2023-12-04

## 2023-12-04 ENCOUNTER — APPOINTMENT (OUTPATIENT)
Dept: MAMMOGRAPHY | Facility: CLINIC | Age: 38
End: 2023-12-04
Payer: MEDICAID

## 2023-12-04 ENCOUNTER — OUTPATIENT (OUTPATIENT)
Dept: OUTPATIENT SERVICES | Facility: HOSPITAL | Age: 38
LOS: 1 days | End: 2023-12-04
Payer: MEDICAID

## 2023-12-04 DIAGNOSIS — Z01.419 ENCOUNTER FOR GYNECOLOGICAL EXAMINATION (GENERAL) (ROUTINE) WITHOUT ABNORMAL FINDINGS: ICD-10-CM

## 2023-12-04 PROCEDURE — 77067 SCR MAMMO BI INCL CAD: CPT

## 2023-12-04 PROCEDURE — 77063 BREAST TOMOSYNTHESIS BI: CPT

## 2023-12-04 PROCEDURE — 77063 BREAST TOMOSYNTHESIS BI: CPT | Mod: 26

## 2023-12-04 PROCEDURE — 77067 SCR MAMMO BI INCL CAD: CPT | Mod: 26

## 2024-01-03 ENCOUNTER — EMERGENCY (EMERGENCY)
Facility: HOSPITAL | Age: 39
LOS: 0 days | Discharge: HOME | End: 2024-01-03
Attending: HOSPITALIST
Payer: MEDICAID

## 2024-01-03 VITALS — RESPIRATION RATE: 18 BRPM | HEART RATE: 86 BPM | OXYGEN SATURATION: 99 %

## 2024-01-03 VITALS — WEIGHT: 149.91 LBS | HEIGHT: 62 IN

## 2024-01-03 DIAGNOSIS — R19.7 DIARRHEA, UNSPECIFIED: ICD-10-CM

## 2024-01-03 DIAGNOSIS — R50.9 FEVER, UNSPECIFIED: ICD-10-CM

## 2024-01-03 DIAGNOSIS — B34.9 VIRAL INFECTION, UNSPECIFIED: ICD-10-CM

## 2024-01-03 DIAGNOSIS — R11.0 NAUSEA: ICD-10-CM

## 2024-01-03 DIAGNOSIS — R00.0 TACHYCARDIA, UNSPECIFIED: ICD-10-CM

## 2024-01-03 PROCEDURE — 93010 ELECTROCARDIOGRAM REPORT: CPT

## 2024-01-03 PROCEDURE — 99284 EMERGENCY DEPT VISIT MOD MDM: CPT

## 2024-01-03 PROCEDURE — 99283 EMERGENCY DEPT VISIT LOW MDM: CPT

## 2024-01-03 PROCEDURE — 93005 ELECTROCARDIOGRAM TRACING: CPT

## 2024-01-03 RX ORDER — ONDANSETRON 8 MG/1
1 TABLET, FILM COATED ORAL
Qty: 9 | Refills: 0
Start: 2024-01-03 | End: 2024-01-05

## 2024-01-03 RX ORDER — ONDANSETRON 8 MG/1
4 TABLET, FILM COATED ORAL ONCE
Refills: 0 | Status: COMPLETED | OUTPATIENT
Start: 2024-01-03 | End: 2024-01-03

## 2024-01-03 RX ORDER — ACETAMINOPHEN 500 MG
650 TABLET ORAL ONCE
Refills: 0 | Status: COMPLETED | OUTPATIENT
Start: 2024-01-03 | End: 2024-01-03

## 2024-01-03 RX ADMIN — Medication 650 MILLIGRAM(S): at 23:32

## 2024-01-03 RX ADMIN — ONDANSETRON 4 MILLIGRAM(S): 8 TABLET, FILM COATED ORAL at 23:33

## 2024-01-03 NOTE — ED PROVIDER NOTE - PATIENT PORTAL LINK FT
You can access the FollowMyHealth Patient Portal offered by [facility] by registering at the following website: [email or web address]. By joining [employer]’s FollowMyHealth portal, you will also be able to view your health information using other applications (apps) compatible with our system. You can access the FollowMyHealth Patient Portal offered by [facility] by registering at the following website: [email or web address]. By joining [employer]’s FollowMyHealth portal, you will also be able to view your health information using other applications (apps) compatible with our system.

## 2024-01-03 NOTE — ED PROVIDER NOTE - OBJECTIVE STATEMENT
38F with 1 day hc of fever, body aches, diarrhea nausea and vomiting. patient went to urgent Care and had viral swab which was negative.  She was prescribed amoxicillin which she took the first dose a few hours ago.  Patient reports then feeling burning inside with bodyaches and mild shortness of breath which is all resolved.  Has not taken any Motrin or Tylenol. Child at home with same symptoms.

## 2024-01-03 NOTE — ED PROVIDER NOTE - PHYSICAL EXAMINATION
Constitutional: NAD AAOx3, well appearing  Eyes: PERRLA EOMI  Head: Normocephalic atraumatic  Mouth: MMM  Cardiac: tachycardiac, regular rhythm  Resp: Lungs CTAB  GI: Abd s/nt/nd  Neuro: CN2-12 intact  Skin: No visible rashes

## 2024-01-03 NOTE — ED ADULT NURSE REASSESSMENT NOTE - NS ED NURSE REASSESS COMMENT FT1
pt aox4, c/o body aches, sore throat, medicated with zofran and tylenol; pt anxious- states she has her baby in waiting room n/a

## 2024-01-03 NOTE — ED PROVIDER NOTE - CLINICAL SUMMARY MEDICAL DECISION MAKING FREE TEXT BOX
38-year-old female with viral symptoms and sick contacts at home.  She is well-appearing.  Will give Zofran and Tylenol here in the ED and discharged home with recommendations for supportive measures and as needed Zofran.

## 2024-01-03 NOTE — ED PROVIDER NOTE - NSFOLLOWUPINSTRUCTIONS_ED_ALL_ED_FT
Take tylenol as needed for fever or pain, 650Mg every 6-8 hours.  You can also take ibuprofen as needed for fever or pain, 600mg every 6-8 hours, take with food.    Please take Zofran as prescribed as needed for nausea/vomiting.  Please return for any new or worsening symptoms.    Please take any previously prescribed medications as instructed

## 2024-01-03 NOTE — ED ADULT TRIAGE NOTE - CHIEF COMPLAINT QUOTE
Pt c/o shortness of breath, cough, congestion, fever, and sore throat x2 days. States she was COVID and FLU negative at urgent care. Denies chest pain. STAT EKG to be completed. -PMH.

## 2024-01-03 NOTE — ED ADULT NURSE NOTE - NSFALLUNIVINTERV_ED_ALL_ED
Bed/Stretcher in lowest position, wheels locked, appropriate side rails in place/Call bell, personal items and telephone in reach/Instruct patient to call for assistance before getting out of bed/chair/stretcher/Non-slip footwear applied when patient is off stretcher/[place] to call system/Physically safe environment - no spills, clutter or unnecessary equipment/Purposeful proactive rounding/Room/bathroom lighting operational, light cord in reach Bed/Stretcher in lowest position, wheels locked, appropriate side rails in place/Call bell, personal items and telephone in reach/Instruct patient to call for assistance before getting out of bed/chair/stretcher/Non-slip footwear applied when patient is off stretcher/[place] to call system/Physically safe environment - no spills, clutter or unnecessary equipment/Purposeful proactive rounding/Room/bathroom lighting operational, light cord in reach

## 2024-01-03 NOTE — ED ADULT TRIAGE NOTE - NS ED TRIAGE AVPU SCALE
Alert-The patient is alert, awake and responds to voice. The patient is oriented to time, place, and person. The triage nurse is able to obtain subjective information.

## 2024-01-12 ENCOUNTER — RESULT REVIEW (OUTPATIENT)
Age: 39
End: 2024-01-12

## 2024-01-12 ENCOUNTER — OUTPATIENT (OUTPATIENT)
Dept: OUTPATIENT SERVICES | Facility: HOSPITAL | Age: 39
LOS: 1 days | End: 2024-01-12
Payer: MEDICAID

## 2024-01-12 ENCOUNTER — APPOINTMENT (OUTPATIENT)
Dept: MRI IMAGING | Facility: CLINIC | Age: 39
End: 2024-01-12
Payer: MEDICAID

## 2024-01-12 DIAGNOSIS — Z12.39 ENCOUNTER FOR OTHER SCREENING FOR MALIGNANT NEOPLASM OF BREAST: ICD-10-CM

## 2024-01-12 PROCEDURE — 77049 MRI BREAST C-+ W/CAD BI: CPT | Mod: 26

## 2024-01-12 PROCEDURE — A9585: CPT

## 2024-01-12 PROCEDURE — C8937: CPT

## 2024-01-12 PROCEDURE — C8908: CPT

## 2024-05-14 NOTE — ED PROVIDER NOTE - CCCP TRG CHIEF CMPLNT
flu-like symptoms
Detail Level: Simple
Price (Do Not Change): 0.00
Instructions: This plan will send the code FBSE to the PM system.  DO NOT or CHANGE the price.

## 2024-12-05 NOTE — ED ADULT NURSE NOTE - NSSUHOSCREENINGYN_ED_ALL_ED
Comprehensive Nutrition Assessment    RECOMMENDATIONS:  PO Diet: NPO  Nutrition Education: Education/Counseling not indicated   3. Nutrition Support:  Day 1: Start PN Clinimix 5/20 at goal rate 41.7 ml/hr.     Day 2: Advance PN Clinimix 5/20 at goal rate 62.5 ml/hr.    Day 3: Advance PN Clinimix 5/20 at goal rate 83.3 ml/hr.   Start 250 ml of 20% lipids twice weekly on 12/09  Obtain Labs: Daily Phos,Mg,K+. Weekly TG recommended.   Pharmacy to adjust electrolytes, MVI and Trace Elements as appropriate.     NUTRITION ASSESSMENT:   Nutritional summary & status: Consult for TPN recommendations.  Pt NPO. S/p exploratory laparotomy with closure of gastrotomy, reji gastrostomy 12/04. PEG tube to gravity. Pt with minimal nutrition x 10 days, plan to start TPN today. See recommendations above.   Admission // PMH: Vertebral fracture//colonic psuedoobstruction, T2DM, HLD, CHF    MALNUTRITION ASSESSMENT  Context of Malnutrition: Acute Illness   Malnutrition Status: Mild malnutrition  Findings of the 6 clinical characteristics of malnutrition (Minimum of 2 out of 6 clinical characteristics is required to make the diagnosis of moderate or severe Protein Calorie Malnutrition based on AND/ASPEN Guidelines):  Energy Intake:  50% or less of estimated energy requirements for 5 or more days  Weight Loss:  Mild weight loss (5% in 3 month period)     Body Fat Loss:  No body fat loss     Muscle Mass Loss:  No muscle mass loss    Fluid Accumulation:  No fluid accumulation     Strength:  Not Performed    NUTRITION DIAGNOSIS   Inadequate oral intake, Inadequate enteral nutrition infusion related to altered GI function as evidenced by NPO or clear liquid status due to medical condition    Nutrition Monitoring and Evaluation:   Food/Nutrient Intake Outcomes:  Parenteral Nutrition Intake/Tolerance  Physical Signs/Symptoms Outcomes:  Biochemical Data, Nutrition Focused Physical Findings, Skin, Weight, GI Status     OBJECTIVE DATA:  Yes - the patient is able to be screened

## 2025-04-14 ENCOUNTER — RESULT REVIEW (OUTPATIENT)
Age: 40
End: 2025-04-14

## 2025-04-19 ENCOUNTER — EMERGENCY (EMERGENCY)
Facility: HOSPITAL | Age: 40
LOS: 0 days | Discharge: HOME | End: 2025-04-19
Attending: EMERGENCY MEDICINE
Payer: MEDICAID

## 2025-04-19 VITALS
WEIGHT: 157.41 LBS | OXYGEN SATURATION: 99 % | TEMPERATURE: 98 F | HEART RATE: 79 BPM | SYSTOLIC BLOOD PRESSURE: 124 MMHG | RESPIRATION RATE: 17 BRPM | DIASTOLIC BLOOD PRESSURE: 77 MMHG

## 2025-04-19 DIAGNOSIS — R51.9 HEADACHE, UNSPECIFIED: ICD-10-CM

## 2025-04-19 DIAGNOSIS — O99.891 OTHER SPECIFIED DISEASES AND CONDITIONS COMPLICATING PREGNANCY: ICD-10-CM

## 2025-04-19 DIAGNOSIS — R11.0 NAUSEA: ICD-10-CM

## 2025-04-19 PROCEDURE — 99284 EMERGENCY DEPT VISIT MOD MDM: CPT

## 2025-04-19 PROCEDURE — 99282 EMERGENCY DEPT VISIT SF MDM: CPT

## 2025-04-19 RX ORDER — ACETAMINOPHEN 500 MG/5ML
1000 LIQUID (ML) ORAL ONCE
Refills: 0 | Status: DISCONTINUED | OUTPATIENT
Start: 2025-04-19 | End: 2025-04-19

## 2025-04-19 RX ORDER — ACETAMINOPHEN 500 MG/5ML
1000 LIQUID (ML) ORAL ONCE
Refills: 0 | Status: COMPLETED | OUTPATIENT
Start: 2025-04-19 | End: 2025-04-19

## 2025-04-19 RX ORDER — ONDANSETRON HCL/PF 4 MG/2 ML
4 VIAL (ML) INJECTION ONCE
Refills: 0 | Status: DISCONTINUED | OUTPATIENT
Start: 2025-04-19 | End: 2025-04-19

## 2025-04-19 RX ADMIN — Medication 1000 MILLIGRAM(S): at 15:51

## 2025-05-29 ENCOUNTER — ASOB RESULT (OUTPATIENT)
Age: 40
End: 2025-05-29

## 2025-05-29 ENCOUNTER — APPOINTMENT (OUTPATIENT)
Dept: ANTEPARTUM | Facility: CLINIC | Age: 40
End: 2025-05-29
Payer: MEDICAID

## 2025-05-29 PROCEDURE — 76815 OB US LIMITED FETUS(S): CPT

## 2025-06-11 ENCOUNTER — APPOINTMENT (OUTPATIENT)
Dept: ANTEPARTUM | Facility: CLINIC | Age: 40
End: 2025-06-11

## 2025-06-12 ENCOUNTER — ASOB RESULT (OUTPATIENT)
Age: 40
End: 2025-06-12

## 2025-06-12 ENCOUNTER — APPOINTMENT (OUTPATIENT)
Dept: MATERNAL FETAL MEDICINE | Facility: CLINIC | Age: 40
End: 2025-06-12
Payer: MEDICAID

## 2025-06-12 PROCEDURE — 99202 OFFICE O/P NEW SF 15 MIN: CPT | Mod: TH,95

## 2025-06-13 ENCOUNTER — APPOINTMENT (OUTPATIENT)
Dept: ANTEPARTUM | Facility: CLINIC | Age: 40
End: 2025-06-13
Payer: MEDICAID

## 2025-06-13 PROCEDURE — 36415 COLL VENOUS BLD VENIPUNCTURE: CPT

## 2025-06-20 ENCOUNTER — NON-APPOINTMENT (OUTPATIENT)
Age: 40
End: 2025-06-20

## 2025-06-26 ENCOUNTER — APPOINTMENT (OUTPATIENT)
Dept: ANTEPARTUM | Facility: CLINIC | Age: 40
End: 2025-06-26

## 2025-07-09 ENCOUNTER — APPOINTMENT (OUTPATIENT)
Age: 40
End: 2025-07-09

## 2025-07-16 ENCOUNTER — ASOB RESULT (OUTPATIENT)
Age: 40
End: 2025-07-16

## 2025-07-16 ENCOUNTER — APPOINTMENT (OUTPATIENT)
Dept: ANTEPARTUM | Facility: CLINIC | Age: 40
End: 2025-07-16
Payer: MEDICAID

## 2025-07-16 ENCOUNTER — APPOINTMENT (OUTPATIENT)
Dept: MATERNAL FETAL MEDICINE | Facility: CLINIC | Age: 40
End: 2025-07-16

## 2025-07-16 PROBLEM — O26.872 CERVICAL SHORTENING IN SECOND TRIMESTER: Status: ACTIVE | Noted: 2025-07-16

## 2025-07-16 PROBLEM — O34.32 CERVICAL FUNNELING AFFECTING PREGNANCY IN SECOND TRIMESTER: Status: ACTIVE | Noted: 2025-07-16

## 2025-07-16 PROCEDURE — 76817 TRANSVAGINAL US OBSTETRIC: CPT | Mod: 59

## 2025-07-16 PROCEDURE — 76811 OB US DETAILED SNGL FETUS: CPT

## 2025-07-16 PROCEDURE — 99215 OFFICE O/P EST HI 40 MIN: CPT | Mod: TH

## 2025-07-16 RX ORDER — PROGESTERONE 90 MG/1.125G
8 GEL VAGINAL DAILY
Qty: 2 | Refills: 4 | Status: ACTIVE | COMMUNITY
Start: 2025-07-16 | End: 1900-01-01

## 2025-07-17 ENCOUNTER — NON-APPOINTMENT (OUTPATIENT)
Age: 40
End: 2025-07-17

## 2025-07-17 RX ORDER — PROGESTERONE 100 MG/1
100 INSERT VAGINAL
Qty: 1 | Refills: 4 | Status: ACTIVE | COMMUNITY
Start: 2025-07-17 | End: 1900-01-01

## 2025-07-24 ENCOUNTER — APPOINTMENT (OUTPATIENT)
Dept: ANTEPARTUM | Facility: CLINIC | Age: 40
End: 2025-07-24
Payer: MEDICAID

## 2025-07-24 ENCOUNTER — ASOB RESULT (OUTPATIENT)
Age: 40
End: 2025-07-24

## 2025-07-24 ENCOUNTER — NON-APPOINTMENT (OUTPATIENT)
Age: 40
End: 2025-07-24

## 2025-07-24 ENCOUNTER — INPATIENT (INPATIENT)
Facility: HOSPITAL | Age: 40
LOS: 0 days | Discharge: HOME | DRG: 819 | End: 2025-07-25
Attending: OBSTETRICS & GYNECOLOGY | Admitting: OBSTETRICS & GYNECOLOGY
Payer: COMMERCIAL

## 2025-07-24 VITALS — DIASTOLIC BLOOD PRESSURE: 75 MMHG | HEART RATE: 88 BPM | SYSTOLIC BLOOD PRESSURE: 128 MMHG

## 2025-07-24 DIAGNOSIS — N88.3 INCOMPETENCE OF CERVIX UTERI: ICD-10-CM

## 2025-07-24 DIAGNOSIS — Z3A.22 22 WEEKS GESTATION OF PREGNANCY: ICD-10-CM

## 2025-07-24 DIAGNOSIS — Z98.890 OTHER SPECIFIED POSTPROCEDURAL STATES: Chronic | ICD-10-CM

## 2025-07-24 DIAGNOSIS — O26.899 OTHER SPECIFIED PREGNANCY RELATED CONDITIONS, UNSPECIFIED TRIMESTER: ICD-10-CM

## 2025-07-24 DIAGNOSIS — O46.90 ANTEPARTUM HEMORRHAGE, UNSPECIFIED, UNSPECIFIED TRIMESTER: ICD-10-CM

## 2025-07-24 LAB
ALBUMIN SERPL ELPH-MCNC: 3.2 G/DL — LOW (ref 3.3–5.2)
ALP SERPL-CCNC: 83 U/L — SIGNIFICANT CHANGE UP (ref 40–120)
ALT FLD-CCNC: 37 U/L — HIGH
ANION GAP SERPL CALC-SCNC: 16 MMOL/L — SIGNIFICANT CHANGE UP (ref 5–17)
AST SERPL-CCNC: 37 U/L — HIGH
BILIRUB SERPL-MCNC: <0.2 MG/DL — LOW (ref 0.4–2)
BLD GP AB SCN SERPL QL: SIGNIFICANT CHANGE UP
BUN SERPL-MCNC: 10.6 MG/DL — SIGNIFICANT CHANGE UP (ref 8–20)
CALCIUM SERPL-MCNC: 8.8 MG/DL — SIGNIFICANT CHANGE UP (ref 8.4–10.5)
CHLORIDE SERPL-SCNC: 104 MMOL/L — SIGNIFICANT CHANGE UP (ref 96–108)
CO2 SERPL-SCNC: 18 MMOL/L — LOW (ref 22–29)
CREAT SERPL-MCNC: 0.64 MG/DL — SIGNIFICANT CHANGE UP (ref 0.5–1.3)
EGFR: 114 ML/MIN/1.73M2 — SIGNIFICANT CHANGE UP
EGFR: 114 ML/MIN/1.73M2 — SIGNIFICANT CHANGE UP
GLUCOSE SERPL-MCNC: 76 MG/DL — SIGNIFICANT CHANGE UP (ref 70–99)
HCT VFR BLD CALC: 37 % — SIGNIFICANT CHANGE UP (ref 34.5–45)
HGB BLD-MCNC: 12.8 G/DL — SIGNIFICANT CHANGE UP (ref 11.5–15.5)
MCHC RBC-ENTMCNC: 31.4 PG — SIGNIFICANT CHANGE UP (ref 27–34)
MCHC RBC-ENTMCNC: 34.6 G/DL — SIGNIFICANT CHANGE UP (ref 32–36)
MCV RBC AUTO: 90.7 FL — SIGNIFICANT CHANGE UP (ref 80–100)
NRBC # BLD AUTO: 0 K/UL — SIGNIFICANT CHANGE UP (ref 0–0)
NRBC # FLD: 0 K/UL — SIGNIFICANT CHANGE UP (ref 0–0)
NRBC BLD AUTO-RTO: 0 /100 WBCS — SIGNIFICANT CHANGE UP (ref 0–0)
PLATELET # BLD AUTO: 238 K/UL — SIGNIFICANT CHANGE UP (ref 150–400)
PMV BLD: 10.6 FL — SIGNIFICANT CHANGE UP (ref 7–13)
POTASSIUM SERPL-MCNC: 4 MMOL/L — SIGNIFICANT CHANGE UP (ref 3.5–5.3)
POTASSIUM SERPL-SCNC: 4 MMOL/L — SIGNIFICANT CHANGE UP (ref 3.5–5.3)
PROT SERPL-MCNC: 6.5 G/DL — LOW (ref 6.6–8.7)
RBC # BLD: 4.08 M/UL — SIGNIFICANT CHANGE UP (ref 3.8–5.2)
RBC # FLD: 13.2 % — SIGNIFICANT CHANGE UP (ref 10.3–14.5)
SODIUM SERPL-SCNC: 138 MMOL/L — SIGNIFICANT CHANGE UP (ref 135–145)
WBC # BLD: 11.61 K/UL — HIGH (ref 3.8–10.5)
WBC # FLD AUTO: 11.61 K/UL — HIGH (ref 3.8–10.5)

## 2025-07-24 PROCEDURE — 76817 TRANSVAGINAL US OBSTETRIC: CPT

## 2025-07-24 PROCEDURE — 76816 OB US FOLLOW-UP PER FETUS: CPT

## 2025-07-24 PROCEDURE — 85027 COMPLETE CBC AUTOMATED: CPT

## 2025-07-24 PROCEDURE — 36415 COLL VENOUS BLD VENIPUNCTURE: CPT

## 2025-07-24 PROCEDURE — 80053 COMPREHEN METABOLIC PANEL: CPT

## 2025-07-24 PROCEDURE — 86850 RBC ANTIBODY SCREEN: CPT

## 2025-07-24 PROCEDURE — 86901 BLOOD TYPING SEROLOGIC RH(D): CPT

## 2025-07-24 PROCEDURE — 86900 BLOOD TYPING SEROLOGIC ABO: CPT

## 2025-07-25 ENCOUNTER — TRANSCRIPTION ENCOUNTER (OUTPATIENT)
Age: 40
End: 2025-07-25

## 2025-07-25 ENCOUNTER — APPOINTMENT (OUTPATIENT)
Dept: ORTHOPEDIC SURGERY | Facility: CLINIC | Age: 40
End: 2025-07-25

## 2025-07-25 VITALS
DIASTOLIC BLOOD PRESSURE: 70 MMHG | OXYGEN SATURATION: 97 % | SYSTOLIC BLOOD PRESSURE: 116 MMHG | TEMPERATURE: 99 F | HEART RATE: 95 BPM | RESPIRATION RATE: 18 BRPM

## 2025-07-25 LAB
AMORPH CRY # UR COMP ASSIST: PRESENT
APPEARANCE UR: ABNORMAL
BACTERIA # UR AUTO: ABNORMAL /HPF
BILIRUB UR-MCNC: NEGATIVE — SIGNIFICANT CHANGE UP
C TRACH RRNA SPEC QL NAA+PROBE: SIGNIFICANT CHANGE UP
COLOR SPEC: YELLOW — SIGNIFICANT CHANGE UP
DIFF PNL FLD: NEGATIVE — SIGNIFICANT CHANGE UP
GLUCOSE UR QL: NEGATIVE MG/DL — SIGNIFICANT CHANGE UP
KETONES UR QL: NEGATIVE MG/DL — SIGNIFICANT CHANGE UP
LEUKOCYTE ESTERASE UR-ACNC: NEGATIVE — SIGNIFICANT CHANGE UP
N GONORRHOEA RRNA SPEC QL NAA+PROBE: SIGNIFICANT CHANGE UP
NITRITE UR-MCNC: NEGATIVE — SIGNIFICANT CHANGE UP
PH UR: 6.5 — SIGNIFICANT CHANGE UP (ref 5–8)
PROT UR-MCNC: NEGATIVE MG/DL — SIGNIFICANT CHANGE UP
RBC CASTS # UR COMP ASSIST: 1 /HPF — SIGNIFICANT CHANGE UP (ref 0–4)
SP GR SPEC: 1.02 — SIGNIFICANT CHANGE UP (ref 1–1.03)
SQUAMOUS # UR AUTO: 10 /HPF — HIGH (ref 0–5)
UROBILINOGEN FLD QL: 1 MG/DL — SIGNIFICANT CHANGE UP (ref 0.2–1)
WBC UR QL: 3 /HPF — SIGNIFICANT CHANGE UP (ref 0–5)

## 2025-07-25 PROCEDURE — 81001 URINALYSIS AUTO W/SCOPE: CPT

## 2025-07-25 PROCEDURE — 85027 COMPLETE CBC AUTOMATED: CPT

## 2025-07-25 PROCEDURE — 87491 CHLMYD TRACH DNA AMP PROBE: CPT

## 2025-07-25 PROCEDURE — 87653 STREP B DNA AMP PROBE: CPT

## 2025-07-25 PROCEDURE — 86901 BLOOD TYPING SEROLOGIC RH(D): CPT

## 2025-07-25 PROCEDURE — 86900 BLOOD TYPING SEROLOGIC ABO: CPT

## 2025-07-25 PROCEDURE — 87591 N.GONORRHOEAE DNA AMP PROB: CPT

## 2025-07-25 PROCEDURE — 86850 RBC ANTIBODY SCREEN: CPT

## 2025-07-25 PROCEDURE — 36415 COLL VENOUS BLD VENIPUNCTURE: CPT

## 2025-07-25 PROCEDURE — 80053 COMPREHEN METABOLIC PANEL: CPT

## 2025-07-25 PROCEDURE — 59320 REVISION OF CERVIX: CPT

## 2025-07-25 RX ORDER — DEXAMETHASONE 0.5 MG/1
4 TABLET ORAL EVERY 6 HOURS
Refills: 0 | Status: DISCONTINUED | OUTPATIENT
Start: 2025-07-25 | End: 2025-07-25

## 2025-07-25 RX ORDER — OXYCODONE HYDROCHLORIDE 30 MG/1
5 TABLET ORAL
Refills: 0 | Status: DISCONTINUED | OUTPATIENT
Start: 2025-07-25 | End: 2025-07-25

## 2025-07-25 RX ORDER — DIPHENHYDRAMINE HCL 12.5MG/5ML
25 ELIXIR ORAL EVERY 4 HOURS
Refills: 0 | Status: DISCONTINUED | OUTPATIENT
Start: 2025-07-25 | End: 2025-07-25

## 2025-07-25 RX ORDER — NALOXONE HYDROCHLORIDE 0.4 MG/ML
0.1 INJECTION, SOLUTION INTRAMUSCULAR; INTRAVENOUS; SUBCUTANEOUS
Refills: 0 | Status: DISCONTINUED | OUTPATIENT
Start: 2025-07-25 | End: 2025-07-25

## 2025-07-25 RX ORDER — SODIUM CHLORIDE 9 G/1000ML
1000 INJECTION, SOLUTION INTRAVENOUS
Refills: 0 | Status: DISCONTINUED | OUTPATIENT
Start: 2025-07-25 | End: 2025-07-25

## 2025-07-25 RX ORDER — ONDANSETRON HCL/PF 4 MG/2 ML
4 VIAL (ML) INJECTION EVERY 6 HOURS
Refills: 0 | Status: DISCONTINUED | OUTPATIENT
Start: 2025-07-25 | End: 2025-07-25

## 2025-07-25 RX ORDER — ACETAMINOPHEN 500 MG/5ML
975 LIQUID (ML) ORAL EVERY 6 HOURS
Refills: 0 | Status: DISCONTINUED | OUTPATIENT
Start: 2025-07-25 | End: 2025-07-25

## 2025-07-25 RX ORDER — ACETAMINOPHEN 500 MG/5ML
650 LIQUID (ML) ORAL ONCE
Refills: 0 | Status: COMPLETED | OUTPATIENT
Start: 2025-07-25 | End: 2025-07-25

## 2025-07-25 RX ADMIN — Medication 650 MILLIGRAM(S): at 09:57

## 2025-07-25 RX ADMIN — Medication 975 MILLIGRAM(S): at 16:19

## 2025-07-26 LAB
GROUP B BETA STREP DNA (PCR): SIGNIFICANT CHANGE UP
SOURCE GROUP B STREP: SIGNIFICANT CHANGE UP

## 2025-07-30 ENCOUNTER — ASOB RESULT (OUTPATIENT)
Age: 40
End: 2025-07-30

## 2025-07-30 ENCOUNTER — APPOINTMENT (OUTPATIENT)
Dept: ANTEPARTUM | Facility: CLINIC | Age: 40
End: 2025-07-30
Payer: MEDICAID

## 2025-07-30 PROCEDURE — 76817 TRANSVAGINAL US OBSTETRIC: CPT

## 2025-09-04 ENCOUNTER — ASOB RESULT (OUTPATIENT)
Age: 40
End: 2025-09-04

## 2025-09-04 ENCOUNTER — APPOINTMENT (OUTPATIENT)
Dept: ANTEPARTUM | Facility: CLINIC | Age: 40
End: 2025-09-04
Payer: MEDICAID

## 2025-09-04 PROCEDURE — 76816 OB US FOLLOW-UP PER FETUS: CPT
